# Patient Record
Sex: MALE | Race: WHITE | NOT HISPANIC OR LATINO | Employment: OTHER | URBAN - METROPOLITAN AREA
[De-identification: names, ages, dates, MRNs, and addresses within clinical notes are randomized per-mention and may not be internally consistent; named-entity substitution may affect disease eponyms.]

---

## 2018-04-03 RX ORDER — MULTIVITAMIN
1 TABLET ORAL EVERY MORNING
COMMUNITY

## 2018-04-03 RX ORDER — ATENOLOL 25 MG/1
25 TABLET ORAL EVERY MORNING
COMMUNITY

## 2018-04-03 RX ORDER — LOSARTAN POTASSIUM 100 MG/1
100 TABLET ORAL EVERY MORNING
COMMUNITY

## 2018-04-03 NOTE — PRE-PROCEDURE INSTRUCTIONS
Pre-Surgery Instructions:   Medication Instructions    atenolol (TENORMIN) 25 mg tablet Instructed patient per Anesthesia Guidelines   losartan (COZAAR) 100 MG tablet Instructed patient per Anesthesia Guidelines   Multiple Vitamin (MULTIVITAMIN) tablet Instructed patient per Anesthesia Guidelines  Patient instructed to take atenolol with sip of water in AM DOS  Patient instructed to stop all supplements and NSAIDS today for surgery on 04/05/2018

## 2018-04-04 ENCOUNTER — ANESTHESIA EVENT (OUTPATIENT)
Dept: PERIOP | Facility: HOSPITAL | Age: 63
End: 2018-04-04
Payer: COMMERCIAL

## 2018-04-05 ENCOUNTER — ANESTHESIA (OUTPATIENT)
Dept: PERIOP | Facility: HOSPITAL | Age: 63
End: 2018-04-05
Payer: COMMERCIAL

## 2018-04-05 ENCOUNTER — HOSPITAL ENCOUNTER (OUTPATIENT)
Facility: HOSPITAL | Age: 63
Setting detail: OUTPATIENT SURGERY
Discharge: HOME | End: 2018-04-05
Attending: UROLOGY | Admitting: UROLOGY
Payer: COMMERCIAL

## 2018-04-05 VITALS
HEART RATE: 58 BPM | DIASTOLIC BLOOD PRESSURE: 68 MMHG | RESPIRATION RATE: 18 BRPM | OXYGEN SATURATION: 99 % | TEMPERATURE: 98.4 F | WEIGHT: 170 LBS | HEIGHT: 67 IN | SYSTOLIC BLOOD PRESSURE: 127 MMHG | BODY MASS INDEX: 26.68 KG/M2

## 2018-04-05 DIAGNOSIS — R35.1 BPH ASSOCIATED WITH NOCTURIA: Primary | ICD-10-CM

## 2018-04-05 DIAGNOSIS — N40.1 BPH ASSOCIATED WITH NOCTURIA: Primary | ICD-10-CM

## 2018-04-05 PROCEDURE — L8699 PROSTHETIC IMPLANT NOS: HCPCS | Performed by: UROLOGY

## 2018-04-05 DEVICE — IMPLANT URO PROSTATE UROLIFT: Type: IMPLANTABLE DEVICE | Site: URINARY BLADDER | Status: FUNCTIONAL

## 2018-04-05 RX ORDER — FENTANYL CITRATE 50 UG/ML
INJECTION, SOLUTION INTRAMUSCULAR; INTRAVENOUS AS NEEDED
Status: DISCONTINUED | OUTPATIENT
Start: 2018-04-05 | End: 2018-04-05 | Stop reason: SURG

## 2018-04-05 RX ORDER — ACETAMINOPHEN 325 MG/1
650 TABLET ORAL EVERY 6 HOURS PRN
Status: DISCONTINUED | OUTPATIENT
Start: 2018-04-05 | End: 2018-04-05 | Stop reason: HOSPADM

## 2018-04-05 RX ORDER — SODIUM CHLORIDE 9 MG/ML
125 INJECTION, SOLUTION INTRAVENOUS CONTINUOUS
Status: DISCONTINUED | OUTPATIENT
Start: 2018-04-05 | End: 2018-04-05 | Stop reason: HOSPADM

## 2018-04-05 RX ORDER — MAGNESIUM HYDROXIDE 1200 MG/15ML
LIQUID ORAL AS NEEDED
Status: DISCONTINUED | OUTPATIENT
Start: 2018-04-05 | End: 2018-04-05 | Stop reason: HOSPADM

## 2018-04-05 RX ORDER — PHENAZOPYRIDINE HYDROCHLORIDE 200 MG/1
200 TABLET, FILM COATED ORAL ONCE
Status: COMPLETED | OUTPATIENT
Start: 2018-04-05 | End: 2018-04-05

## 2018-04-05 RX ORDER — FENTANYL CITRATE 50 UG/ML
50 INJECTION, SOLUTION INTRAMUSCULAR; INTRAVENOUS
Status: DISCONTINUED | OUTPATIENT
Start: 2018-04-05 | End: 2018-04-05 | Stop reason: HOSPADM

## 2018-04-05 RX ORDER — CIPROFLOXACIN 500 MG/1
500 TABLET, FILM COATED ORAL EVERY 12 HOURS SCHEDULED
Qty: 10 TABLET | Refills: 0 | Status: SHIPPED | OUTPATIENT
Start: 2018-04-05 | End: 2018-04-10

## 2018-04-05 RX ORDER — OXYCODONE HYDROCHLORIDE AND ACETAMINOPHEN 5; 325 MG/1; MG/1
1 TABLET ORAL EVERY 4 HOURS PRN
Status: DISCONTINUED | OUTPATIENT
Start: 2018-04-05 | End: 2018-04-05 | Stop reason: HOSPADM

## 2018-04-05 RX ORDER — TAMSULOSIN HYDROCHLORIDE 0.4 MG/1
0.4 CAPSULE ORAL ONCE
Status: COMPLETED | OUTPATIENT
Start: 2018-04-05 | End: 2018-04-05

## 2018-04-05 RX ORDER — ONDANSETRON 2 MG/ML
4 INJECTION INTRAMUSCULAR; INTRAVENOUS EVERY 6 HOURS PRN
Status: DISCONTINUED | OUTPATIENT
Start: 2018-04-05 | End: 2018-04-05 | Stop reason: HOSPADM

## 2018-04-05 RX ORDER — KETOROLAC TROMETHAMINE 30 MG/ML
INJECTION, SOLUTION INTRAMUSCULAR; INTRAVENOUS AS NEEDED
Status: DISCONTINUED | OUTPATIENT
Start: 2018-04-05 | End: 2018-04-05 | Stop reason: SURG

## 2018-04-05 RX ORDER — PROPOFOL 10 MG/ML
INJECTION, EMULSION INTRAVENOUS AS NEEDED
Status: DISCONTINUED | OUTPATIENT
Start: 2018-04-05 | End: 2018-04-05 | Stop reason: SURG

## 2018-04-05 RX ORDER — LIDOCAINE HYDROCHLORIDE 20 MG/ML
JELLY TOPICAL AS NEEDED
Status: DISCONTINUED | OUTPATIENT
Start: 2018-04-05 | End: 2018-04-05 | Stop reason: HOSPADM

## 2018-04-05 RX ORDER — ONDANSETRON 2 MG/ML
INJECTION INTRAMUSCULAR; INTRAVENOUS AS NEEDED
Status: DISCONTINUED | OUTPATIENT
Start: 2018-04-05 | End: 2018-04-05 | Stop reason: SURG

## 2018-04-05 RX ORDER — CIPROFLOXACIN 2 MG/ML
400 INJECTION, SOLUTION INTRAVENOUS
Status: DISCONTINUED | OUTPATIENT
Start: 2018-04-05 | End: 2018-04-05 | Stop reason: HOSPADM

## 2018-04-05 RX ORDER — MIDAZOLAM HYDROCHLORIDE 1 MG/ML
INJECTION INTRAMUSCULAR; INTRAVENOUS AS NEEDED
Status: DISCONTINUED | OUTPATIENT
Start: 2018-04-05 | End: 2018-04-05 | Stop reason: SURG

## 2018-04-05 RX ORDER — PHENAZOPYRIDINE HYDROCHLORIDE 200 MG/1
200 TABLET, FILM COATED ORAL
Qty: 6 TABLET | Refills: 0 | Status: SHIPPED | OUTPATIENT
Start: 2018-04-05 | End: 2018-04-07

## 2018-04-05 RX ADMIN — CIPROFLOXACIN: 2 INJECTION INTRAVENOUS at 10:20

## 2018-04-05 RX ADMIN — ONDANSETRON HYDROCHLORIDE 4 MG: 2 INJECTION, SOLUTION INTRAVENOUS at 10:32

## 2018-04-05 RX ADMIN — SODIUM CHLORIDE: 0.9 INJECTION, SOLUTION INTRAVENOUS at 10:42

## 2018-04-05 RX ADMIN — PROPOFOL 200 MG: 10 INJECTION, EMULSION INTRAVENOUS at 10:20

## 2018-04-05 RX ADMIN — FENTANYL CITRATE 50 MCG: 50 INJECTION, SOLUTION INTRAMUSCULAR; INTRAVENOUS at 10:29

## 2018-04-05 RX ADMIN — KETOROLAC TROMETHAMINE 30 MG: 30 INJECTION, SOLUTION INTRAMUSCULAR at 10:51

## 2018-04-05 RX ADMIN — SODIUM CHLORIDE 125 ML/HR: 0.9 INJECTION, SOLUTION INTRAVENOUS at 09:09

## 2018-04-05 RX ADMIN — FENTANYL CITRATE 50 MCG: 50 INJECTION, SOLUTION INTRAMUSCULAR; INTRAVENOUS at 10:38

## 2018-04-05 RX ADMIN — MIDAZOLAM HYDROCHLORIDE 2 MG: 1 INJECTION, SOLUTION INTRAMUSCULAR; INTRAVENOUS at 10:14

## 2018-04-05 RX ADMIN — TAMSULOSIN HYDROCHLORIDE 0.4 MG: 0.4 CAPSULE ORAL at 12:30

## 2018-04-05 RX ADMIN — PHENAZOPYRIDINE HYDROCHLORIDE 200 MG: 200 TABLET ORAL at 12:29

## 2018-04-05 RX ADMIN — DEXAMETHASONE SODIUM PHOSPHATE 8 MG: 10 INJECTION INTRAMUSCULAR; INTRAVENOUS at 10:31

## 2018-04-05 NOTE — OP NOTE
OPERATIVE REPORT    PATIENT NAME: Blayne Vega    :  1955  MRN: [id number]  Pt Location: [facility] OR ROOM 05    SURGERY DATE:   2018  Surgeon(s) and Role:   DO Marleny Nguyen Primary    Preop Diagnosis:  Enlarged prostate with lower urinary tract symptoms (LUTS) [N40 1]  Post-Op Diagnosis Codes:   Enlarged prostate with lower urinary tract symptoms (LUTS) [N40 1]  Procedure(s):  CYSTOSCOPY WITH INSERTION UROLIFT    Specimen(s):  None    Estimated Blood Loss:   Minimal    Drains: 20 Green[place]ic Villa catheter       Anesthesia Type:   General    Operative Indications:   symptomatic BPH with obstruction   failed transurethral needle ablation of the prostate      Operative Findings:   small prostate with bilateral lateral lobe obstruction   prostatic urethra measures approximately 2 5 -9 7TN    Complications:   None    Procedure and Technique:   patient was identified   general anesthesia was administered   genitals were prepped and draped   time-out was taken   rigid cystoscopy was performed   prostatic urethra measured approximately 2 5-3 cm   with obstructing lateral lobes bilaterally   bladder was slightly trabeculated   bladder was free of any stone tumor foreign body or other pathology   Urolift  Implant was placed 1 5 cm distal to the bladder neck on the left side   identical procedure was done 1 5 cm distal to the bladder neck on the right side   attention was then directed to the verumontanum where a 3rd implant was placed   on the left side at the level of the verumontanum    A 4th implant was placed at the level of the verumontanum on the right   cystoscopy was then done and it was felt that no more implants were necessary   nor was there  Sufficient space for placement of additional implants   a continuous anterior channel was created by placement of the  Four above-mentioned implants   cystoscope was removed   anesthetic  Jelly was instilled per urethra   20 Green[place]ic Villa catheter was placed with 30 cc in the balloon   patient tolerated the procedure well   and went to recovery room in good postoperative condition   Villa catheter will be removed tomorrow in the office       I was present for the entire procedure    Patient Disposition:  PACU     SIGNATURE: Jeanine Stephens DO  DATE: April 5, 2018  TIME: 11:28 AM

## 2018-04-05 NOTE — ANESTHESIA POSTPROCEDURE EVALUATION
Post-Op Assessment Note      CV Status:  Stable    Mental Status:  Alert and awake    Hydration Status:  Euvolemic    PONV Controlled:  Controlled    Airway Patency:  Patent    Post Op Vitals Reviewed: Yes          Staff: Anesthesiologist           /79 (04/05/18 1136)    Temp 98 4 °F (36 9 °C) (04/05/18 1136)    Pulse 58 (04/05/18 1136)   Resp 19 (04/05/18 1136)    SpO2 99 % (04/05/18 1136)

## 2018-04-05 NOTE — ANESTHESIA PREPROCEDURE EVALUATION
Review of Systems/Medical History  Patient summary reviewed  Chart reviewed      Cardiovascular  Exercise tolerance: good,  Hypertension controlled,    Pulmonary  Negative pulmonary ROS        GI/Hepatic  Negative GI/hepatic ROS          Negative  ROS        Endo/Other  Negative endo/other ROS      GYN  Negative gynecology ROS          Hematology  Negative hematology ROS      Musculoskeletal  Negative musculoskeletal ROS        Neurology  Negative neurology ROS      Psychology   Negative psychology ROS              Physical Exam    Airway    Mallampati score: II  TM Distance: <3 FB  Neck ROM: full     Dental       Cardiovascular  Rhythm: regular, Rate: normal,     Pulmonary  Breath sounds clear to auscultation,     Other Findings  Upper front cap        Anesthesia Plan  ASA Score- 2     Anesthesia Type- general with ASA Monitors  Additional Monitors:   Airway Plan:         Plan Factors- Patient instructed to abstain from smoking on day of procedure  Patient did not smoke on day of surgery  Induction- intravenous  Postoperative Plan-     Informed Consent- Anesthetic plan and risks discussed with patient

## 2018-04-05 NOTE — DISCHARGE INSTRUCTIONS
Villa Catheter Placement and Care   AMBULATORY CARE:   A Villa catheter  is a sterile tube that is inserted into your bladder to drain urine  It is also called an indwelling urinary catheter  The tip of the catheter has a small balloon filled with solution that holds the catheter in your bladder  How a Villa catheter is placed:   · Your healthcare provider will clean your genital area with a sterile solution  He or she will put lubricant jelly on the catheter to help it go in smoothly  The end of the catheter with the deflated balloon will be inserted into your urethra  The catheter will be moved slowly and gently into your bladder  You may be asked to take slow, deep breaths or to push as if you were trying to urinate as the catheter is inserted  · When your healthcare provider sees urine flowing from the catheter, he or she will fill the balloon at the end of the catheter  The balloon holds the catheter in place so it does not come out  Your healthcare provider will attach the open end of the catheter to a sterile drainage bag  Seek care immediately if:   · Your catheter comes out  · You suddenly have material that looks like sand in the tubing or drainage bag  · No urine is draining into the bag and you have checked the system  · You have pain in your hip, back, pelvis, or lower abdomen  · You are confused or cannot think clearly  Contact your healthcare provider if:   · You have a fever  · You have bladder spasms for more than 1 day after the catheter is placed  · You see blood in the tubing or drainage bag  · You have a rash or itching where the catheter tube is secured to your skin  · Urine leaks from or around the catheter, tubing, or drainage bag  · The closed drainage system has accidently come open or apart  · You see a layer of crystals inside the tubing  · You have questions or concerns about your condition or care    Care for your Villa catheter: · Clean your genital area 2 times every day  Clean your catheter and the area around where it was inserted  Use soap and water  Clean your anal opening and catheter area after every bowel movement  · Secure the catheter tube  so you do not pull or move the catheter  This helps prevent pain and bladder spasms  Healthcare providers will show you how to use medical tape or a strap to secure the catheter tube to your body  · Keep a closed drainage system  Your Villa catheter should always be attached to the drainage bag to form a closed system  Do not disconnect any part of the closed system unless you need to change the bag  Care for your drainage bag:   · Ask if a leg bag is right for you  A leg bag can be worn under your clothes  Ask your healthcare provider for more information about a leg bag  · Keep the drainage bag below the level of your waist   This helps stop urine from moving back up the tubing and into your bladder  Do not loop or kink the tubing  This can cause urine to back up and collect in your bladder  Do not let the drainage bag touch or lie on the floor  · Empty the drainage bag when needed  The weight of a full drainage bag can be painful  Empty the drainage bag every 3 to 6 hours or when it is ? full  · Clean and change the drainage bag as directed  Ask your healthcare provider how often you should change the drainage bag and what cleaning solution to use  Wear disposable gloves when you change the bag  Do not allow the end of the catheter or tubing to touch anything  Clean the ends with an alcohol pad before you reconnect them  What to do if problems develop:   · No urine is draining into the bag:      ¨ Check for kinks in the tubing and straighten them out  ¨ Check the tape or strap used to secure the catheter tube to your skin  Make sure it is not blocking the tube  ¨ Make sure you are not sitting or lying on the tubing      ¨ Make sure the urine bag is hanging below the level of your waist     · Urine leaks from or around the catheter, tubing, or drainage bag:  Check if the closed drainage system has accidently come open or apart  Clean the catheter and tubing ends with a new alcohol pad and reconnect them  Prevent an infection:   · Wash your hands often  Wash before and after you touch your catheter, tubing, or drainage bag  Use soap and water  Wear clean disposable gloves when you care for your catheter or disconnect the drainage bag  Wash your hands before you prepare or eat food  · Drink liquids as directed  Ask your healthcare provider how much liquid to drink each day and which liquids are best for you  Liquids will help flush your kidneys and bladder to help prevent infection  Follow up with your healthcare provider as directed:  Write down your questions so you remember to ask them during your visits  © 2017 [employer] Information is for End User's use only and may not be sold, redistributed or otherwise used for commercial purposes  All illustrations and images included in CareNotes® are the copyrighted property of [employer]  or [employer]  The above information is an  only  It is not intended as medical advice for individual conditions or treatments  Talk to your doctor, nurse or pharmacist before following any medical regimen to see if it is safe and effective for you

## 2018-04-06 ENCOUNTER — HOSPITAL ENCOUNTER (EMERGENCY)
Facility: HOSPITAL | Age: 63
Discharge: HOME | End: 2018-04-06
Payer: COMMERCIAL

## 2018-04-06 VITALS
OXYGEN SATURATION: 96 % | HEART RATE: 72 BPM | RESPIRATION RATE: 16 BRPM | TEMPERATURE: 97.9 F | DIASTOLIC BLOOD PRESSURE: 82 MMHG | WEIGHT: 168 LBS | SYSTOLIC BLOOD PRESSURE: 162 MMHG

## 2018-04-06 DIAGNOSIS — R33.9 URINARY RETENTION: Primary | ICD-10-CM

## 2018-04-06 PROCEDURE — 99283 EMERGENCY DEPT VISIT LOW MDM: CPT

## 2018-04-06 RX ORDER — LIDOCAINE HYDROCHLORIDE 20 MG/ML
JELLY TOPICAL ONCE
Status: COMPLETED | OUTPATIENT
Start: 2018-04-06 | End: 2018-04-06

## 2018-04-06 RX ADMIN — LIDOCAINE HYDROCHLORIDE: 20 JELLY TOPICAL at 20:24

## 2018-04-07 ENCOUNTER — HOSPITAL ENCOUNTER (EMERGENCY)
Facility: HOSPITAL | Age: 63
Discharge: HOME | End: 2018-04-07
Admitting: EMERGENCY MEDICINE
Payer: COMMERCIAL

## 2018-04-07 VITALS
RESPIRATION RATE: 16 BRPM | SYSTOLIC BLOOD PRESSURE: 136 MMHG | DIASTOLIC BLOOD PRESSURE: 88 MMHG | OXYGEN SATURATION: 95 % | WEIGHT: 167.99 LBS | HEART RATE: 83 BPM | TEMPERATURE: 97.4 F

## 2018-04-07 DIAGNOSIS — T83.9XXA FOLEY CATHETER PROBLEM, INITIAL ENCOUNTER (HCC): ICD-10-CM

## 2018-04-07 DIAGNOSIS — Z97.8 FOLEY CATHETER IN PLACE: Primary | ICD-10-CM

## 2018-04-07 PROCEDURE — 99283 EMERGENCY DEPT VISIT LOW MDM: CPT

## 2018-04-07 RX ORDER — ACETAMINOPHEN 325 MG/1
975 TABLET ORAL ONCE
Status: COMPLETED | OUTPATIENT
Start: 2018-04-07 | End: 2018-04-07

## 2018-04-07 RX ORDER — OXYCODONE HYDROCHLORIDE 5 MG/1
5 TABLET ORAL ONCE
Status: COMPLETED | OUTPATIENT
Start: 2018-04-07 | End: 2018-04-07

## 2018-04-07 RX ORDER — OXYCODONE HYDROCHLORIDE 5 MG/1
5 TABLET ORAL EVERY 4 HOURS PRN
Qty: 14 TABLET | Refills: 0 | Status: SHIPPED | OUTPATIENT
Start: 2018-04-07 | End: 2018-04-17

## 2018-04-07 RX ORDER — IBUPROFEN 400 MG/1
800 TABLET ORAL ONCE
Status: COMPLETED | OUTPATIENT
Start: 2018-04-07 | End: 2018-04-07

## 2018-04-07 RX ORDER — ACETAMINOPHEN 500 MG
500 TABLET ORAL EVERY 6 HOURS PRN
Qty: 30 TABLET | Refills: 0 | Status: SHIPPED | OUTPATIENT
Start: 2018-04-07

## 2018-04-07 RX ORDER — IBUPROFEN 400 MG/1
400 TABLET ORAL EVERY 6 HOURS PRN
Qty: 30 TABLET | Refills: 0 | Status: SHIPPED | OUTPATIENT
Start: 2018-04-07

## 2018-04-07 RX ADMIN — ACETAMINOPHEN 975 MG: 325 TABLET, FILM COATED ORAL at 03:53

## 2018-04-07 RX ADMIN — OXYCODONE HYDROCHLORIDE 5 MG: 5 TABLET ORAL at 03:53

## 2018-04-07 RX ADMIN — IBUPROFEN 800 MG: 400 TABLET ORAL at 03:53

## 2018-04-07 NOTE — ED PROVIDER NOTES
History  Chief Complaint   Patient presents with    Urinary Retention     Pt had uro-lift at [facility] yesterday; had catheter removed this am; Pt stopped urinating around 13:00 today; Talked to urologist and was advised to come to ED       History provided by:  Patient   used: No    Urinary Catheter Insertion or Check   Location:  Suprapubic pain sensation of urinary retention  Severity:  Severe  Onset quality:  Gradual  Timing:  Constant  Progression:  Worsening  Chronicity:  New  Context:  Recent urological procedure  Relieved by:  Nothing  Worsened by: Inability to void  Associated symptoms: abdominal pain (Nothing)    Associated symptoms: no chest pain, no congestion, no cough, no diarrhea, no ear pain, no fatigue, no rash and no shortness of breath    Abdominal pain:     Location:  Suprapubic    Quality: not aching      Severity:  Moderate      None       History reviewed  No pertinent past medical history  History reviewed  No pertinent surgical history  History reviewed  No pertinent family history  I have reviewed and agree with the history as documented  Social History   Substance Use Topics    Smoking status: Never Smoker    Smokeless tobacco: Never Used    Alcohol use Yes      Comment: social        Review of Systems   Constitutional: Negative for fatigue  HENT: Negative for congestion and ear pain  Eyes: Negative for pain  Respiratory: Negative for cough and shortness of breath  Cardiovascular: Negative for chest pain  Gastrointestinal: Positive for abdominal pain (Nothing)  Negative for diarrhea  Endocrine: Negative for polydipsia  Genitourinary: Negative for enuresis  Musculoskeletal: Negative for gait problem  Skin: Negative for rash  Allergic/Immunologic: Negative for food allergies  Neurological: Negative for numbness  Hematological: Negative for adenopathy  Psychiatric/Behavioral: Negative for behavioral problems         Physical Exam  ED Triage Vitals [04/06/18 1931]   Temperature Pulse Respirations Blood Pressure SpO2   97 9 °F (36 6 °C) 72 16 162/82 96 %      Temp Source Heart Rate Source Patient Position - Orthostatic VS BP Location FiO2 (%)   Oral Monitor -- -- --      Pain Score       6           Orthostatic Vital Signs  Vitals:    04/06/18 1931   BP: 162/82   Pulse: 72       Physical Exam   Constitutional: He appears well-developed and well-nourished  He appears distressed  Distress secondary to inability to void  HENT:   Head: Normocephalic and atraumatic  Eyes: Conjunctivae are normal    Neck: Neck supple  No JVD present  Cardiovascular: Normal rate  Pulmonary/Chest: Effort normal    Abdominal: He exhibits no distension  There is tenderness in the suprapubic area  Skin: He is not diaphoretic  ED Medications  Medications   lidocaine (URO-JET) 2 % topical gel ( Topical Given 4/6/18 2024)       Diagnostic Studies  Results Reviewed     None                 No orders to display              Procedures  Bladder Cath  Date/Time: 4/6/2018 8:36 PM  Performed by: Yonathan Pope by: Davon Hudson     Patient location:  ED  Procedure performed by consultant: Mari Woods  Consent:     Consent obtained:  Verbal    Consent given by:  Patient    Risks discussed:  False passage, infection, urethral injury, incomplete procedure and pain    Alternatives discussed:  No treatment and delayed treatment  Universal protocol:     Procedure explained and questions answered to patient or proxy's satisfaction: yes      Relevant documents present and verified: yes      Patient identity confirmed:  Verbally with patient, arm band, provided demographic data and hospital-assigned identification number  Pre-procedure details:     Procedure purpose:  Therapeutic  Anesthesia (see MAR for exact dosages):      Anesthesia method:  Topical application    Topical anesthetic:  Lidocaine gel  Procedure details:     Bladder irrigation: no      Catheter insertion:  Indwelling    Approach: natural orifice      Catheter type:  Non-latex    Catheter size:  16 Fr    Number of attempts:  1    Successful placement: yes      Urine characteristics:  Clear and yellow  Post-procedure details:     Patient tolerance of procedure: Tolerated well, no immediate complications           Phone Contacts  ED Phone Contact    ED Course  ED Course                                MDM  Number of Diagnoses or Management Options  Urinary retention:   Diagnosis management comments: 56-year-old male presents emergency department for inability to void and suprapubic tenderness  Patient with recent urological instrumentation and surgical procedure  Patient states that rapid inability to void this day  Ultrasound was utilized to visualize bladder with significant amount of fluid  Villa catheter was placed by myself  Patient approximately 550 milliliters of clear yellow tinged urine  Patient does admit to taking peridium  No evidence of blood in urine  Patient tolerated procedure well and had significant improvement of his symptoms  Villa catheter will remain in place with close follow up with his known urologist   Patient encouraged to take medicines as prescribed  Patient educated on persistent or worsening signs symptoms and to return to the emergency department or follow up with his known urologist   Patient admits to understanding and agreement  Patient was discharged in ambulatory improved condition  CritCare Time    Disposition  Final diagnoses:   Urinary retention     Time reflects when diagnosis was documented in both MDM as applicable and the Disposition within this note     Time User Action Codes Description Comment    4/6/2018  8:34 PM Alicia Allen Add [R33 9] Urinary retention       ED Disposition     ED Disposition Condition Comment    Discharge  Rosanna Cota discharge to home/self care      Condition at discharge: Stable        Follow-up Information     Follow up With Specialties Details Why Contact Info    Jeffery Prader, DO Urology   [address] [facility]  [place] [address] [place] [address]  [phone]          There are no discharge medications for this patient  No discharge procedures on file      ED Provider  Electronically Signed by           Berkley Casper PA-C  04/09/18 9779

## 2018-04-07 NOTE — DISCHARGE INSTRUCTIONS
Urinary Retention in Men   WHAT YOU NEED TO KNOW:   Urinary retention is a condition that develops when your bladder does not empty completely when you urinate  DISCHARGE INSTRUCTIONS:   Medicines:   · Medicines  can help decrease the size of your prostate, fight infection, and help you urinate more easily  · Take your medicine as directed  Contact your healthcare provider if you think your medicine is not helping or if you have side effects  Tell him or her if you are allergic to any medicine  Keep a list of the medicines, vitamins, and herbs you take  Include the amounts, and when and why you take them  Bring the list or the pill bottles to follow-up visits  Carry your medicine list with you in case of an emergency  Villa catheter care: You may need a Villa catheter for up to 2 weeks at home  Healthcare providers will give you a smaller leg bag to collect urine  Keep the bag below your waist  This will prevent urine from flowing back into your bladder and causing an infection or other problems  Also, keep the tube free of kinks so the urine will drain properly  Do not pull on the catheter  This can cause pain and bleeding, and may cause the catheter to come out  Ask your healthcare provider or urologist for more information on Villa catheter care  Urinate regularly:  When your catheter is removed, do not let your bladder become too full before you urinate  Set regular times each day to urinate  Urinate as soon as you feel the need or at least every 3 hours while you are awake  Do not drink liquids before you go to bed  Urinate right before you go to bed  Follow up with your healthcare provider or urologist as directed:  Write down your questions so you remember to ask them during your visits  Contact your healthcare provider or urologist if:   · You have a fever  · You have pain when you urinate  · You have blood in your urine  · You have problems with your catheter      · You have questions or concerns about your condition or care  Return to the emergency department if:   · You have severe abdominal pain  · You are breathing faster than usual     · Your heartbeat is faster than usual     · Your face, hands, feet, or ankles are swollen  © 2017 [employer] Information is for End User's use only and may not be sold, redistributed or otherwise used for commercial purposes  All illustrations and images included in CareNotes® are the copyrighted property of [employer]  or [employer]  The above information is an  only  It is not intended as medical advice for individual conditions or treatments  Talk to your doctor, nurse or pharmacist before following any medical regimen to see if it is safe and effective for you

## 2018-04-07 NOTE — ED PROVIDER NOTES
History  Chief Complaint   Patient presents with    Urinary Catheter Problem     patient reports no drainage from catheter since 2300  HPI    None       History reviewed  No pertinent past medical history  History reviewed  No pertinent surgical history  History reviewed  No pertinent family history  I have reviewed and agree with the history as documented  Social History   Substance Use Topics    Smoking status: Never Smoker    Smokeless tobacco: Never Used    Alcohol use Yes      Comment: social        Review of Systems    Physical Exam  ED Triage Vitals [04/07/18 0330]   Temperature Pulse Respirations Blood Pressure SpO2   (!) 97 4 °F (36 3 °C) 83 16 136/88 95 %      Temp Source Heart Rate Source Patient Position - Orthostatic VS BP Location FiO2 (%)   Oral -- Standing Right arm --      Pain Score       --           Orthostatic Vital Signs  Vitals:    04/07/18 0330   BP: 136/88   Pulse: 83   Patient Position - Orthostatic VS: Standing       Physical Exam    ED Medications  Medications   oxyCODONE (ROXICODONE) IR tablet 5 mg (5 mg Oral Given 4/7/18 0353)   acetaminophen (TYLENOL) tablet 975 mg (975 mg Oral Given 4/7/18 0353)   ibuprofen (MOTRIN) tablet 800 mg (800 mg Oral Given 4/7/18 0353)       Diagnostic Studies  Results Reviewed     None                 No orders to display              Procedures  Procedures       Phone Contacts  ED Phone Contact    ED Course  ED Course                                MDM  Number of Diagnoses or Management Options  Villa catheter in place: Villa catheter problem, initial encounter [facility]):   Diagnosis management comments: Mr Pabon Re presents emergency department for urinary urgency from earlier evaluation this shift  Villa catheter was placed by myself  Ultrasound demonstrates that recent Villa placed is working appropriately  There is clear urine in bag  No evidence of blood  Patient does admit that he did not drink fluids when he got home    At this time will not treat any differently  Will provide narcotic pain medication for his discomfort  CritCare Time    Disposition  Final diagnoses: Villa catheter in place   Villa catheter problem, initial encounter [facility])     Time reflects when diagnosis was documented in both MDM as applicable and the Disposition within this note     Time User Action Codes Description Comment    4/7/2018  3:44 AM Alicia Allen Add [Z92 89] Villa catheter in place     4/7/2018  3:45 AM Pricilla Leigh Add [T83  [id number]] Villa catheter problem, initial encounter [facility])       ED Disposition     ED Disposition Condition Comment    Discharge  Rosanna Linrosaurazuleika discharge to home/self care  Condition at discharge: Stable        Follow-up Information     Follow up With Specialties Details Why [address] [facility],  Urology   [address]  [phone]          Discharge Medication List as of 4/7/2018  3:49 AM      START taking these medications    Details   acetaminophen (TYLENOL) 500 mg tablet Take 1 tablet (500 mg total) by mouth every 6 (six) hours as needed for mild pain, moderate pain, headaches or fever, Starting Sat 4/7/2018, Print      ibuprofen (MOTRIN) 400 mg tablet Take 1 tablet (400 mg total) by mouth every 6 (six) hours as needed for mild pain, Starting Sat 4/7/2018, Print      oxyCODONE (ROXICODONE) 5 mg immediate release tablet Take 1 tablet (5 mg total) by mouth every 4 (four) hours as needed for moderate pain for up to 10 days Max Daily Amount: 30 mg, Starting Sat 4/7/2018, Until Tue 4/17/2018, Print           No discharge procedures on file      ED Provider  Electronically Signed by           Jane Valladares PA-C  04/09/18 2191

## 2020-05-27 ENCOUNTER — OFFICE VISIT (OUTPATIENT)
Dept: PHYSICAL THERAPY | Facility: CLINIC | Age: 65
End: 2020-05-27
Payer: MEDICARE

## 2020-05-27 DIAGNOSIS — M25.562 ACUTE PAIN OF LEFT KNEE: ICD-10-CM

## 2020-05-27 DIAGNOSIS — Z96.652 STATUS POST LEFT KNEE REPLACEMENT: Primary | ICD-10-CM

## 2020-05-27 PROCEDURE — 97112 NEUROMUSCULAR REEDUCATION: CPT | Performed by: PHYSICAL THERAPIST

## 2020-05-27 PROCEDURE — 97110 THERAPEUTIC EXERCISES: CPT | Performed by: PHYSICAL THERAPIST

## 2020-05-27 PROCEDURE — 97014 ELECTRIC STIMULATION THERAPY: CPT | Performed by: PHYSICAL THERAPIST

## 2020-05-27 PROCEDURE — 97162 PT EVAL MOD COMPLEX 30 MIN: CPT | Performed by: PHYSICAL THERAPIST

## 2020-05-28 ENCOUNTER — TRANSCRIBE ORDERS (OUTPATIENT)
Dept: PHYSICAL THERAPY | Facility: CLINIC | Age: 65
End: 2020-05-28

## 2020-05-28 DIAGNOSIS — Z96.652 STATUS POST LEFT KNEE REPLACEMENT: Primary | ICD-10-CM

## 2020-05-28 DIAGNOSIS — M25.562 ACUTE PAIN OF LEFT KNEE: ICD-10-CM

## 2020-05-29 ENCOUNTER — OFFICE VISIT (OUTPATIENT)
Dept: PHYSICAL THERAPY | Facility: CLINIC | Age: 65
End: 2020-05-29
Payer: MEDICARE

## 2020-05-29 DIAGNOSIS — Z96.652 STATUS POST LEFT KNEE REPLACEMENT: Primary | ICD-10-CM

## 2020-05-29 DIAGNOSIS — M25.562 ACUTE PAIN OF LEFT KNEE: ICD-10-CM

## 2020-05-29 PROCEDURE — 97110 THERAPEUTIC EXERCISES: CPT | Performed by: PHYSICAL THERAPIST

## 2020-05-29 PROCEDURE — 97112 NEUROMUSCULAR REEDUCATION: CPT | Performed by: PHYSICAL THERAPIST

## 2020-05-29 PROCEDURE — 97116 GAIT TRAINING THERAPY: CPT | Performed by: PHYSICAL THERAPIST

## 2020-06-01 ENCOUNTER — OFFICE VISIT (OUTPATIENT)
Dept: PHYSICAL THERAPY | Facility: CLINIC | Age: 65
End: 2020-06-01
Payer: MEDICARE

## 2020-06-01 DIAGNOSIS — Z96.652 STATUS POST LEFT KNEE REPLACEMENT: Primary | ICD-10-CM

## 2020-06-01 DIAGNOSIS — M25.562 ACUTE PAIN OF LEFT KNEE: ICD-10-CM

## 2020-06-01 PROCEDURE — 97140 MANUAL THERAPY 1/> REGIONS: CPT | Performed by: PHYSICAL THERAPIST

## 2020-06-01 PROCEDURE — 97110 THERAPEUTIC EXERCISES: CPT | Performed by: PHYSICAL THERAPIST

## 2020-06-01 PROCEDURE — 97112 NEUROMUSCULAR REEDUCATION: CPT | Performed by: PHYSICAL THERAPIST

## 2020-06-03 ENCOUNTER — OFFICE VISIT (OUTPATIENT)
Dept: PHYSICAL THERAPY | Facility: CLINIC | Age: 65
End: 2020-06-03
Payer: MEDICARE

## 2020-06-03 DIAGNOSIS — Z96.652 STATUS POST LEFT KNEE REPLACEMENT: Primary | ICD-10-CM

## 2020-06-03 DIAGNOSIS — M25.562 ACUTE PAIN OF LEFT KNEE: ICD-10-CM

## 2020-06-03 PROCEDURE — 97110 THERAPEUTIC EXERCISES: CPT | Performed by: PHYSICAL THERAPIST

## 2020-06-03 PROCEDURE — 97140 MANUAL THERAPY 1/> REGIONS: CPT | Performed by: PHYSICAL THERAPIST

## 2020-06-03 PROCEDURE — 97112 NEUROMUSCULAR REEDUCATION: CPT | Performed by: PHYSICAL THERAPIST

## 2020-06-05 ENCOUNTER — OFFICE VISIT (OUTPATIENT)
Dept: PHYSICAL THERAPY | Facility: CLINIC | Age: 65
End: 2020-06-05
Payer: MEDICARE

## 2020-06-05 DIAGNOSIS — Z96.652 STATUS POST LEFT KNEE REPLACEMENT: Primary | ICD-10-CM

## 2020-06-05 DIAGNOSIS — M25.562 ACUTE PAIN OF LEFT KNEE: ICD-10-CM

## 2020-06-05 PROCEDURE — 97140 MANUAL THERAPY 1/> REGIONS: CPT | Performed by: PHYSICAL THERAPIST

## 2020-06-05 PROCEDURE — 97110 THERAPEUTIC EXERCISES: CPT | Performed by: PHYSICAL THERAPIST

## 2020-06-05 PROCEDURE — 97112 NEUROMUSCULAR REEDUCATION: CPT | Performed by: PHYSICAL THERAPIST

## 2020-06-08 ENCOUNTER — OFFICE VISIT (OUTPATIENT)
Dept: PHYSICAL THERAPY | Facility: CLINIC | Age: 65
End: 2020-06-08
Payer: MEDICARE

## 2020-06-08 DIAGNOSIS — M25.562 ACUTE PAIN OF LEFT KNEE: ICD-10-CM

## 2020-06-08 DIAGNOSIS — Z96.652 STATUS POST LEFT KNEE REPLACEMENT: Primary | ICD-10-CM

## 2020-06-08 PROCEDURE — 97110 THERAPEUTIC EXERCISES: CPT | Performed by: PHYSICAL THERAPIST

## 2020-06-08 PROCEDURE — 97112 NEUROMUSCULAR REEDUCATION: CPT | Performed by: PHYSICAL THERAPIST

## 2020-06-08 PROCEDURE — 97140 MANUAL THERAPY 1/> REGIONS: CPT | Performed by: PHYSICAL THERAPIST

## 2020-06-10 ENCOUNTER — OFFICE VISIT (OUTPATIENT)
Dept: PHYSICAL THERAPY | Facility: CLINIC | Age: 65
End: 2020-06-10
Payer: MEDICARE

## 2020-06-10 DIAGNOSIS — Z96.652 STATUS POST LEFT KNEE REPLACEMENT: Primary | ICD-10-CM

## 2020-06-10 DIAGNOSIS — M25.562 ACUTE PAIN OF LEFT KNEE: ICD-10-CM

## 2020-06-10 PROCEDURE — 97140 MANUAL THERAPY 1/> REGIONS: CPT | Performed by: PHYSICAL THERAPIST

## 2020-06-10 PROCEDURE — 97110 THERAPEUTIC EXERCISES: CPT | Performed by: PHYSICAL THERAPIST

## 2020-06-10 PROCEDURE — 97112 NEUROMUSCULAR REEDUCATION: CPT | Performed by: PHYSICAL THERAPIST

## 2020-06-12 ENCOUNTER — OFFICE VISIT (OUTPATIENT)
Dept: PHYSICAL THERAPY | Facility: CLINIC | Age: 65
End: 2020-06-12
Payer: MEDICARE

## 2020-06-12 DIAGNOSIS — M25.562 ACUTE PAIN OF LEFT KNEE: ICD-10-CM

## 2020-06-12 DIAGNOSIS — Z96.652 STATUS POST LEFT KNEE REPLACEMENT: Primary | ICD-10-CM

## 2020-06-12 PROCEDURE — 97116 GAIT TRAINING THERAPY: CPT | Performed by: PHYSICAL THERAPIST

## 2020-06-12 PROCEDURE — 97110 THERAPEUTIC EXERCISES: CPT | Performed by: PHYSICAL THERAPIST

## 2020-06-12 PROCEDURE — 97140 MANUAL THERAPY 1/> REGIONS: CPT | Performed by: PHYSICAL THERAPIST

## 2020-06-12 PROCEDURE — 97112 NEUROMUSCULAR REEDUCATION: CPT | Performed by: PHYSICAL THERAPIST

## 2020-06-15 ENCOUNTER — OFFICE VISIT (OUTPATIENT)
Dept: PHYSICAL THERAPY | Facility: CLINIC | Age: 65
End: 2020-06-15
Payer: MEDICARE

## 2020-06-15 DIAGNOSIS — M25.562 ACUTE PAIN OF LEFT KNEE: ICD-10-CM

## 2020-06-15 DIAGNOSIS — Z96.652 STATUS POST LEFT KNEE REPLACEMENT: Primary | ICD-10-CM

## 2020-06-15 PROCEDURE — 97112 NEUROMUSCULAR REEDUCATION: CPT | Performed by: PHYSICAL THERAPIST

## 2020-06-15 PROCEDURE — 97110 THERAPEUTIC EXERCISES: CPT | Performed by: PHYSICAL THERAPIST

## 2020-06-15 PROCEDURE — 97140 MANUAL THERAPY 1/> REGIONS: CPT | Performed by: PHYSICAL THERAPIST

## 2020-06-17 ENCOUNTER — OFFICE VISIT (OUTPATIENT)
Dept: PHYSICAL THERAPY | Facility: CLINIC | Age: 65
End: 2020-06-17
Payer: MEDICARE

## 2020-06-17 DIAGNOSIS — M25.562 ACUTE PAIN OF LEFT KNEE: ICD-10-CM

## 2020-06-17 DIAGNOSIS — Z96.652 STATUS POST LEFT KNEE REPLACEMENT: Primary | ICD-10-CM

## 2020-06-17 PROCEDURE — 97140 MANUAL THERAPY 1/> REGIONS: CPT | Performed by: PHYSICAL THERAPIST

## 2020-06-17 PROCEDURE — 97112 NEUROMUSCULAR REEDUCATION: CPT | Performed by: PHYSICAL THERAPIST

## 2020-06-17 PROCEDURE — 97110 THERAPEUTIC EXERCISES: CPT | Performed by: PHYSICAL THERAPIST

## 2020-06-19 ENCOUNTER — OFFICE VISIT (OUTPATIENT)
Dept: PHYSICAL THERAPY | Facility: CLINIC | Age: 65
End: 2020-06-19
Payer: MEDICARE

## 2020-06-19 DIAGNOSIS — M25.562 ACUTE PAIN OF LEFT KNEE: ICD-10-CM

## 2020-06-19 DIAGNOSIS — Z96.652 STATUS POST LEFT KNEE REPLACEMENT: Primary | ICD-10-CM

## 2020-06-19 PROCEDURE — 97140 MANUAL THERAPY 1/> REGIONS: CPT | Performed by: PHYSICAL THERAPIST

## 2020-06-19 PROCEDURE — 97110 THERAPEUTIC EXERCISES: CPT | Performed by: PHYSICAL THERAPIST

## 2020-06-19 PROCEDURE — 97112 NEUROMUSCULAR REEDUCATION: CPT | Performed by: PHYSICAL THERAPIST

## 2020-06-22 ENCOUNTER — OFFICE VISIT (OUTPATIENT)
Dept: PHYSICAL THERAPY | Facility: CLINIC | Age: 65
End: 2020-06-22
Payer: MEDICARE

## 2020-06-22 DIAGNOSIS — Z96.652 STATUS POST LEFT KNEE REPLACEMENT: Primary | ICD-10-CM

## 2020-06-22 DIAGNOSIS — M25.562 ACUTE PAIN OF LEFT KNEE: ICD-10-CM

## 2020-06-22 PROCEDURE — 97112 NEUROMUSCULAR REEDUCATION: CPT | Performed by: PHYSICAL THERAPIST

## 2020-06-22 PROCEDURE — 97140 MANUAL THERAPY 1/> REGIONS: CPT | Performed by: PHYSICAL THERAPIST

## 2020-06-22 PROCEDURE — 97110 THERAPEUTIC EXERCISES: CPT | Performed by: PHYSICAL THERAPIST

## 2020-06-24 ENCOUNTER — OFFICE VISIT (OUTPATIENT)
Dept: PHYSICAL THERAPY | Facility: CLINIC | Age: 65
End: 2020-06-24
Payer: MEDICARE

## 2020-06-24 DIAGNOSIS — Z96.652 STATUS POST LEFT KNEE REPLACEMENT: Primary | ICD-10-CM

## 2020-06-24 DIAGNOSIS — M25.562 ACUTE PAIN OF LEFT KNEE: ICD-10-CM

## 2020-06-24 PROCEDURE — 97140 MANUAL THERAPY 1/> REGIONS: CPT | Performed by: PHYSICAL THERAPIST

## 2020-06-24 PROCEDURE — 97110 THERAPEUTIC EXERCISES: CPT | Performed by: PHYSICAL THERAPIST

## 2020-06-24 PROCEDURE — 97112 NEUROMUSCULAR REEDUCATION: CPT | Performed by: PHYSICAL THERAPIST

## 2020-06-26 ENCOUNTER — OFFICE VISIT (OUTPATIENT)
Dept: PHYSICAL THERAPY | Facility: CLINIC | Age: 65
End: 2020-06-26
Payer: MEDICARE

## 2020-06-26 DIAGNOSIS — M25.562 ACUTE PAIN OF LEFT KNEE: ICD-10-CM

## 2020-06-26 DIAGNOSIS — Z96.652 STATUS POST LEFT KNEE REPLACEMENT: Primary | ICD-10-CM

## 2020-06-26 PROCEDURE — 97140 MANUAL THERAPY 1/> REGIONS: CPT | Performed by: PHYSICAL THERAPIST

## 2020-06-26 PROCEDURE — 97110 THERAPEUTIC EXERCISES: CPT | Performed by: PHYSICAL THERAPIST

## 2020-06-26 PROCEDURE — 97112 NEUROMUSCULAR REEDUCATION: CPT | Performed by: PHYSICAL THERAPIST

## 2020-06-29 ENCOUNTER — OFFICE VISIT (OUTPATIENT)
Dept: PHYSICAL THERAPY | Facility: CLINIC | Age: 65
End: 2020-06-29
Payer: MEDICARE

## 2020-06-29 DIAGNOSIS — Z96.652 STATUS POST LEFT KNEE REPLACEMENT: Primary | ICD-10-CM

## 2020-06-29 DIAGNOSIS — M25.562 ACUTE PAIN OF LEFT KNEE: ICD-10-CM

## 2020-06-29 PROCEDURE — 97110 THERAPEUTIC EXERCISES: CPT | Performed by: PHYSICAL THERAPIST

## 2020-06-29 PROCEDURE — 97112 NEUROMUSCULAR REEDUCATION: CPT | Performed by: PHYSICAL THERAPIST

## 2020-06-29 PROCEDURE — 97140 MANUAL THERAPY 1/> REGIONS: CPT | Performed by: PHYSICAL THERAPIST

## 2020-07-01 ENCOUNTER — OFFICE VISIT (OUTPATIENT)
Dept: PHYSICAL THERAPY | Facility: CLINIC | Age: 65
End: 2020-07-01
Payer: MEDICARE

## 2020-07-01 DIAGNOSIS — Z96.652 STATUS POST LEFT KNEE REPLACEMENT: Primary | ICD-10-CM

## 2020-07-01 DIAGNOSIS — M25.562 ACUTE PAIN OF LEFT KNEE: ICD-10-CM

## 2020-07-01 PROCEDURE — 97110 THERAPEUTIC EXERCISES: CPT | Performed by: PHYSICAL THERAPIST

## 2020-07-01 PROCEDURE — 97112 NEUROMUSCULAR REEDUCATION: CPT | Performed by: PHYSICAL THERAPIST

## 2020-07-01 PROCEDURE — 97140 MANUAL THERAPY 1/> REGIONS: CPT | Performed by: PHYSICAL THERAPIST

## 2020-07-01 NOTE — PROGRESS NOTES
Daily Note     Today's date: 2020  Patient name: Frances Jones  : 1955  MRN: [id number]  Referring provider: Adonay Xie MD  Dx:   Encounter Diagnosis     ICD-10-CM    1  Status post left knee replacement [id number] [id number]    2  Acute pain of left knee [id number]                   Subjective: Reports riding 33 miles yesterday and it felt great  Objective: See treatment diary below    Assessment: Tolerated treatment well  Patient would benefit from continued PT   122 degrees for PROM flexion post tx  Plan: Continue per plan of care        Precautions: none     Manuals       Prone knee extension MET, MWM, STM 15 ND ND ND ND ND ND                    PF joint mobs  ND ND ND ND ND ND                   Neuro Re-Ed             Quad set 20x10" ND ND                       exaggerated gait 4x20dt ND ND ND ND ND ND                                Tband TKE Blue 20x5 ND ND ND ND ND ND      Step ups   20x ND ND ND ND      Ther Ex             LLLD stretch for extension        5 min      Prone RF      3 min          AROM bike 10 min ND ND ND ND ND ND      VG DLS  59% 57C ND ND ND ND ND ND      Quadriped knee flexion 20x10" ND ND  ND ND ND      LAQ 30x MRE20x ND  ND ND ND      Leg press  45lbs 2x10 ND ND ND ND Nd ND      Knee flexion off edge of chair    20x         Ther Activity                                       Gait Training             Push off                          Modalities             h-wave

## 2020-07-03 ENCOUNTER — APPOINTMENT (OUTPATIENT)
Dept: PHYSICAL THERAPY | Facility: CLINIC | Age: 65
End: 2020-07-03
Payer: MEDICARE

## 2020-07-03 ENCOUNTER — OFFICE VISIT (OUTPATIENT)
Dept: PHYSICAL THERAPY | Facility: CLINIC | Age: 65
End: 2020-07-03
Payer: MEDICARE

## 2020-07-03 DIAGNOSIS — M25.562 ACUTE PAIN OF LEFT KNEE: ICD-10-CM

## 2020-07-03 DIAGNOSIS — Z96.652 STATUS POST LEFT KNEE REPLACEMENT: Primary | ICD-10-CM

## 2020-07-03 PROCEDURE — 97140 MANUAL THERAPY 1/> REGIONS: CPT | Performed by: PHYSICAL THERAPIST

## 2020-07-03 PROCEDURE — 97112 NEUROMUSCULAR REEDUCATION: CPT | Performed by: PHYSICAL THERAPIST

## 2020-07-03 PROCEDURE — 97110 THERAPEUTIC EXERCISES: CPT | Performed by: PHYSICAL THERAPIST

## 2020-07-03 NOTE — PROGRESS NOTES
Daily Note     Today's date: 7/3/2020  Patient name: Ludwin Parks  : 1955  MRN: [id number]  Referring provider: Nate Thayer MD  Dx:   Encounter Diagnosis     ICD-10-CM    1  Status post left knee replacement Z96 652    2  Acute pain of left knee M25 562                   Subjective: Reports continued stiffness with bending and straightening  Objective: See treatment diary below    Assessment: Tolerated treatment well  Patient would benefit from continued PT  124 degrees for PROM flexion post tx  Plan: Continue per plan of care        Precautions: none     Manuals 6/17 6/19 6/22 6/24 6/26 6/29 7/1 7/3     Prone knee extension MET, MWM, STM 15 ND ND ND ND ND ND ND                   PF joint mobs  ND ND ND ND ND ND ND                  Neuro Re-Ed             Quad set 20x10" ND ND                       exaggerated gait 4x20dt ND ND ND ND ND ND ND                               Tband TKE Blue 20x5 ND ND ND ND ND ND ND     Step ups   20x ND ND ND ND ND     Ther Ex             LLLD stretch for extension        5 min      Prone RF      3 min     ND     AROM bike 10 min ND ND ND ND ND ND ND     VG DLS  89% 17T ND ND ND ND ND ND 50% ND     Quadriped knee flexion 20x10" ND ND  ND ND ND ND     LAQ 30x MRE20x ND  ND ND ND 5lbs ND     Leg press  45lbs 2x10 ND ND ND ND Nd ND ND     Knee flexion off edge of chair    20x         Ther Activity                                       Gait Training             Push off                          Modalities             h-wave

## 2020-07-06 ENCOUNTER — OFFICE VISIT (OUTPATIENT)
Dept: PHYSICAL THERAPY | Facility: CLINIC | Age: 65
End: 2020-07-06
Payer: MEDICARE

## 2020-07-06 DIAGNOSIS — Z96.652 STATUS POST LEFT KNEE REPLACEMENT: Primary | ICD-10-CM

## 2020-07-06 DIAGNOSIS — M25.562 ACUTE PAIN OF LEFT KNEE: ICD-10-CM

## 2020-07-06 PROCEDURE — 97140 MANUAL THERAPY 1/> REGIONS: CPT | Performed by: PHYSICAL THERAPIST

## 2020-07-06 PROCEDURE — 97110 THERAPEUTIC EXERCISES: CPT | Performed by: PHYSICAL THERAPIST

## 2020-07-06 PROCEDURE — 97112 NEUROMUSCULAR REEDUCATION: CPT | Performed by: PHYSICAL THERAPIST

## 2020-07-06 NOTE — PROGRESS NOTES
Daily Note     Today's date: 2020  Patient name: Marco Alvarez  : 1955  MRN: [id number]  Referring provider: Kota Groves MD  Dx:   Encounter Diagnosis     ICD-10-CM    1  Status post left knee replacement Z96 652    2  Acute pain of left knee M25 562                   Subjective: Reports that the knee felt good over the weekend  Objective: See treatment diary below    Assessment: Tolerated treatment well  Patient would benefit from continued PT  124 degrees for PROM flexion post tx  Plan: Continue per plan of care        Precautions: none     Manuals 6/17 6/19 6/22 6/24 6/26 6/29 7/1 7/3 7/6    Prone knee extension MET, MWM, STM 15 ND ND ND ND ND ND ND ND                  PF joint mobs  ND ND ND ND ND ND ND ND                 Neuro Re-Ed             Quad set 20x10" ND ND                       exaggerated gait 4x20dt ND ND ND ND ND ND ND ND    Eccentric closed chain knee flexion          2x10    MRE hamsting         10x    Tband TKE Blue 20x5 ND ND ND ND ND ND ND ND    Step ups   20x ND ND ND ND ND ND    Ther Ex             LLLD stretch for extension        5 min      Prone RF      3 min     ND ND    AROM bike 10 min ND ND ND ND ND ND ND ND    VG DLS  71% 49V ND ND ND ND ND ND 50% ND ND    Quadriped knee flexion 20x10" ND ND  ND ND ND ND ND    LAQ 30x MRE20x ND  ND ND ND 5lbs ND ND    Leg press  45lbs 2x10 ND ND ND ND Nd ND ND 60lbs  3x10    Knee flexion off edge of chair    20x         Ther Activity                                       Gait Training             Push off                          Modalities             h-wave

## 2020-07-08 ENCOUNTER — OFFICE VISIT (OUTPATIENT)
Dept: PHYSICAL THERAPY | Facility: CLINIC | Age: 65
End: 2020-07-08
Payer: MEDICARE

## 2020-07-08 DIAGNOSIS — M25.562 ACUTE PAIN OF LEFT KNEE: ICD-10-CM

## 2020-07-08 DIAGNOSIS — Z96.652 STATUS POST LEFT KNEE REPLACEMENT: Primary | ICD-10-CM

## 2020-07-08 PROCEDURE — 97110 THERAPEUTIC EXERCISES: CPT | Performed by: PHYSICAL THERAPIST

## 2020-07-08 PROCEDURE — 97112 NEUROMUSCULAR REEDUCATION: CPT | Performed by: PHYSICAL THERAPIST

## 2020-07-08 PROCEDURE — 97140 MANUAL THERAPY 1/> REGIONS: CPT | Performed by: PHYSICAL THERAPIST

## 2020-07-08 NOTE — PROGRESS NOTES
Daily Note     Today's date: 2020  Patient name: Remington Lloyd  : 1955  MRN: [id number]  Referring provider: Yazan Mackay MD  Dx:   Encounter Diagnosis     ICD-10-CM    1  Status post left knee replacement Z96 652    2  Acute pain of left knee M25 562                   Subjective: Reports continued difficulty with bending but graston improved mobilty    Objective: See treatment diary below    Assessment: Tolerated treatment well  Patient would benefit from continued PT   120 degrees for PROM flexion post tx  Plan: Continue per plan of care        Precautions: none     Manuals             Prone knee extension MET, MWM, STM 15                          PF joint mobs    Graston to quad ND                         Neuro Re-Ed             Functional RF stretch 10x10"            lunges 10x            exaggerated gait NP            Eccentric closed chain knee flexion  NP            MRE hamsting 10x            Tband TKE Blue 20x5            Step ups 20x            Ther Ex                                       AROM bike 10 min            VG DLS  65% 20W            Quadriped knee flexion 20x10"            LAQ 30x 5lbs             Leg press  60 lbs 2x10                         Ther Activity                                       Gait Training                                       Modalities

## 2020-07-10 ENCOUNTER — OFFICE VISIT (OUTPATIENT)
Dept: PHYSICAL THERAPY | Facility: CLINIC | Age: 65
End: 2020-07-10
Payer: MEDICARE

## 2020-07-10 DIAGNOSIS — M25.562 ACUTE PAIN OF LEFT KNEE: ICD-10-CM

## 2020-07-10 DIAGNOSIS — Z96.652 STATUS POST LEFT KNEE REPLACEMENT: Primary | ICD-10-CM

## 2020-07-10 PROCEDURE — 97140 MANUAL THERAPY 1/> REGIONS: CPT | Performed by: PHYSICAL THERAPIST

## 2020-07-10 PROCEDURE — 97110 THERAPEUTIC EXERCISES: CPT | Performed by: PHYSICAL THERAPIST

## 2020-07-10 PROCEDURE — 97112 NEUROMUSCULAR REEDUCATION: CPT | Performed by: PHYSICAL THERAPIST

## 2020-07-10 NOTE — PROGRESS NOTES
Daily Note     Today's date: 7/10/2020  Patient name: Humble Jenkins  : 1955  MRN: [id number]  Referring provider: Palma Sosa MD  Dx:   Encounter Diagnosis     ICD-10-CM    1  Status post left knee replacement Z96 652    2  Acute pain of left knee M25 562                   Subjective: Reports continued difficulty with descending stairs    Objective: See treatment diary below    Assessment: Tolerated treatment well  Patient would benefit from continued PT   122 degrees for PROM flexion post tx  Plan: Continue per plan of care        Precautions: none     Manuals 7/8 7/10           Prone knee extension MET, MWM, STM 15 ND                         PF joint mobs    Graston to quad ND ND                        Neuro Re-Ed             Functional RF stretch 10x10" ND           lunges 10x ND           exaggerated gait NP            Eccentric closed chain knee flexion  NP            MRE hamsting 10x ND           Tband TKE Blue 20x5 ND           Step ups 20x            Ther Ex                                       AROM bike 10 min ND           VG DLS  71% 85X ND           Quadriped knee flexion 20x10" ND           LAQ 30x 5lbs  ND           Leg press  60 lbs 2x10 ND                        Ther Activity                                       Gait Training                                       Modalities

## 2020-07-13 ENCOUNTER — OFFICE VISIT (OUTPATIENT)
Dept: PHYSICAL THERAPY | Facility: CLINIC | Age: 65
End: 2020-07-13
Payer: MEDICARE

## 2020-07-13 DIAGNOSIS — Z96.652 STATUS POST LEFT KNEE REPLACEMENT: Primary | ICD-10-CM

## 2020-07-13 PROCEDURE — 97140 MANUAL THERAPY 1/> REGIONS: CPT | Performed by: PHYSICAL THERAPIST

## 2020-07-13 PROCEDURE — 97112 NEUROMUSCULAR REEDUCATION: CPT | Performed by: PHYSICAL THERAPIST

## 2020-07-13 PROCEDURE — 97110 THERAPEUTIC EXERCISES: CPT | Performed by: PHYSICAL THERAPIST

## 2020-07-13 NOTE — PROGRESS NOTES
Daily Note     Today's date: 2020  Patient name: Danielle Beatty  : 1955  MRN: [id number]  Referring provider: Allan Chiu MD  Dx:   Encounter Diagnosis     ICD-10-CM    1  Status post left knee replacement Z96 652                   Subjective: Pt reports going down stairs has continued to be the most difficult recently  No issues with HEP or after lv  Objective: See treatment diary below      Assessment: Tolerated treatment well  Patient would benefit from continued PT  Per pt, ND measured knee flexion at 118* today  Pt continues to progress in activity well  Continue to strengthen as tolerated  Plan: Continue per plan of care        Precautions: none     Manuals 7/8 7/10 7/13          Prone knee extension MET, MWM, STM 15 ND ND                        PF joint mobs    Graston to quad ND ND ND                       Neuro Re-Ed             Functional RF stretch 10x10" ND CB          lunges 10x ND CB          exaggerated gait NP            Eccentric closed chain knee flexion  NP  x10          MRE hamsting 10x ND ND          Tband TKE Blue 20x5 ND CB          Step ups 20x            Ther Ex                                       AROM bike 10 min ND CB          VG DLS  25% 44Z ND CB          Quadriped knee flexion 20x10" ND CB          LAQ 30x 5lbs  ND CB          Leg press  60 lbs 2x10 ND CB                       Ther Activity                                       Gait Training                                       Modalities

## 2020-07-15 ENCOUNTER — OFFICE VISIT (OUTPATIENT)
Dept: PHYSICAL THERAPY | Facility: CLINIC | Age: 65
End: 2020-07-15
Payer: MEDICARE

## 2020-07-15 DIAGNOSIS — Z96.652 STATUS POST LEFT KNEE REPLACEMENT: Primary | ICD-10-CM

## 2020-07-15 DIAGNOSIS — M25.562 ACUTE PAIN OF LEFT KNEE: ICD-10-CM

## 2020-07-15 PROCEDURE — 97110 THERAPEUTIC EXERCISES: CPT | Performed by: PHYSICAL THERAPIST

## 2020-07-15 PROCEDURE — 97112 NEUROMUSCULAR REEDUCATION: CPT | Performed by: PHYSICAL THERAPIST

## 2020-07-15 PROCEDURE — 97140 MANUAL THERAPY 1/> REGIONS: CPT | Performed by: PHYSICAL THERAPIST

## 2020-07-15 NOTE — PROGRESS NOTES
Daily Note     Today's date: 7/15/2020  Patient name: Basil Grant  : 1955  MRN: [id number]  Referring provider: Mary Kay Barrios MD  Dx:   Encounter Diagnosis     ICD-10-CM    1  Status post left knee replacement Z96 652    2  Acute pain of left knee M25 562                   Subjective: Patient continues to report limitation with bending  Objective: See treatment diary below    Assessment: Tolerated treatment well  Patient would benefit from continued PT  Plan: Continue per plan of care        Precautions: none     Manuals 7/8 7/10 7/13 7/15         Prone knee extension MET, MWM, STM 15 ND ND ND                       PF joint mobs    Graston to quad ND ND ND ND                      Neuro Re-Ed             Functional RF stretch 10x10" ND CB ND         lunges 10x ND CB ND         exaggerated gait NP            Eccentric closed chain knee flexion  NP  x10          MRE hamsting 10x ND ND ND         Tband TKE Blue 20x5 ND CB          Step ups 20x            Ther Ex                                       AROM bike 10 min ND CB ND         VG DLS  06% 55W ND CB ND         Quadriped knee flexion 20x10" ND CB ND         LAQ 30x 5lbs  ND CB Knee extension machine 10lbs 3x10         Leg press  60 lbs 2x10 ND CB ND                      Ther Activity                                       Gait Training                                       Modalities

## 2020-07-17 ENCOUNTER — OFFICE VISIT (OUTPATIENT)
Dept: PHYSICAL THERAPY | Facility: CLINIC | Age: 65
End: 2020-07-17
Payer: MEDICARE

## 2020-07-17 DIAGNOSIS — M25.562 ACUTE PAIN OF LEFT KNEE: ICD-10-CM

## 2020-07-17 DIAGNOSIS — Z96.652 STATUS POST LEFT KNEE REPLACEMENT: Primary | ICD-10-CM

## 2020-07-17 PROCEDURE — 97112 NEUROMUSCULAR REEDUCATION: CPT | Performed by: PHYSICAL THERAPIST

## 2020-07-17 PROCEDURE — 97110 THERAPEUTIC EXERCISES: CPT | Performed by: PHYSICAL THERAPIST

## 2020-07-17 PROCEDURE — 97140 MANUAL THERAPY 1/> REGIONS: CPT | Performed by: PHYSICAL THERAPIST

## 2020-07-17 NOTE — PROGRESS NOTES
Daily Note     Today's date: 2020  Patient name: Sharon Pardo  : 1955  MRN: [id number]  Referring provider: Nik Vela MD  Dx:   Encounter Diagnosis     ICD-10-CM    1  Status post left knee replacement Z96 652    2  Acute pain of left knee M25 562                   Subjective: Patient continues to report limitation with bending  Objective: See treatment diary below    Assessment: Tolerated treatment well  Patient would benefit from continued PT  Plan: Continue per plan of care        Precautions: none     Manuals 7/8 7/10 7/13 7/15 7/17        Prone knee extension MET, MWM, STM 15 ND ND ND ND                      PF joint mobs    Graston to quad ND ND ND ND ND                     Neuro Re-Ed             Functional RF stretch 10x10" ND CB ND ND        lunges 10x ND CB ND 20x        exaggerated gait NP            Eccentric closed chain knee flexion  NP  x10          MRE hamsting 10x ND ND ND ND        Tband TKE Blue 20x5 ND CB          Step ups 20x            Ther Ex                                       AROM bike 10 min ND CB ND ND        VG DLS  73% 29Z ND CB ND ND        Quadriped knee flexion 20x10" ND CB ND ND        LAQ 30x 5lbs  ND CB Knee extension machine 10lbs 3x10 ND        Leg press  60 lbs 2x10 ND CB ND ND                     Ther Activity                                       Gait Training                                       Modalities

## 2020-07-20 ENCOUNTER — OFFICE VISIT (OUTPATIENT)
Dept: PHYSICAL THERAPY | Facility: CLINIC | Age: 65
End: 2020-07-20
Payer: MEDICARE

## 2020-07-20 DIAGNOSIS — M25.562 ACUTE PAIN OF LEFT KNEE: ICD-10-CM

## 2020-07-20 DIAGNOSIS — Z96.652 STATUS POST LEFT KNEE REPLACEMENT: Primary | ICD-10-CM

## 2020-07-20 PROCEDURE — 97110 THERAPEUTIC EXERCISES: CPT | Performed by: PHYSICAL THERAPIST

## 2020-07-20 PROCEDURE — 97140 MANUAL THERAPY 1/> REGIONS: CPT | Performed by: PHYSICAL THERAPIST

## 2020-07-20 PROCEDURE — 97112 NEUROMUSCULAR REEDUCATION: CPT | Performed by: PHYSICAL THERAPIST

## 2020-07-20 NOTE — PROGRESS NOTES
Daily Note     Today's date: 2020  Patient name: Samra Cabezas  : 1955  MRN: [id number]  Referring provider: Mo Leal MD  Dx:   Encounter Diagnosis     ICD-10-CM    1  Status post left knee replacement Z96 652    2  Acute pain of left knee M25 562                   Subjective: Patient continues to report limitation with bending  Objective: See treatment diary below 0 -120 degrees, cretitus noted with flexion  MMT hip ext and abd 4/    Assessment: Tolerated treatment well  Patient would benefit from continued PT  Plan: Continue per plan of care        Precautions: none     Manuals 7/8 7/10 7/13 7/15 7/17 7/20       Prone knee extension MET, MWM, STM 15 ND ND ND ND ND                     PF joint mobs    Graston to quad ND ND ND ND ND ND                    Neuro Re-Ed             Functional RF stretch 10x10" ND CB ND ND ND       lunges 10x ND CB ND 20x ND       exaggerated gait NP            Eccentric closed chain knee flexion  NP  x10          MRE hamsting 10x ND ND ND ND ND       Tband TKE Blue 20x5 ND CB   ND       Step ups 20x     eccnetric 10x       Ther Ex                                       AROM bike 10 min ND CB ND ND ND       VG DLS  39% 46G ND CB ND ND ND       Quadriped knee flexion 20x10" ND CB ND ND ND       LAQ 30x 5lbs  ND CB Knee extension machine 10lbs 3x10 ND Nd       Leg press  60 lbs 2x10 ND CB ND ND ND                    Ther Activity                                       Gait Training                                       Modalities

## 2020-07-22 ENCOUNTER — OFFICE VISIT (OUTPATIENT)
Dept: PHYSICAL THERAPY | Facility: CLINIC | Age: 65
End: 2020-07-22
Payer: MEDICARE

## 2020-07-22 DIAGNOSIS — Z96.652 STATUS POST LEFT KNEE REPLACEMENT: Primary | ICD-10-CM

## 2020-07-22 DIAGNOSIS — M25.562 ACUTE PAIN OF LEFT KNEE: ICD-10-CM

## 2020-07-22 PROCEDURE — 97140 MANUAL THERAPY 1/> REGIONS: CPT | Performed by: PHYSICAL THERAPIST

## 2020-07-22 PROCEDURE — 97110 THERAPEUTIC EXERCISES: CPT | Performed by: PHYSICAL THERAPIST

## 2020-07-22 PROCEDURE — 97112 NEUROMUSCULAR REEDUCATION: CPT | Performed by: PHYSICAL THERAPIST

## 2020-07-22 NOTE — PROGRESS NOTES
Daily Note     Today's date: 2020  Patient name: You Trimble  : 1955  MRN: [id number]  Referring provider: Triny Foreman MD  Dx:   Encounter Diagnosis     ICD-10-CM    1  Status post left knee replacement Z96 652    2  Acute pain of left knee M25 562                   Subjective: Patient reports MD was happy with his progress    Objective: See treatment diary below 0 -125 degrees, cretitus noted with flexion  MMT hip ext and abd 4-/    Assessment: Tolerated treatment well  Patient would benefit from continued PT  Plan: Continue per plan of care        Precautions: none     Manuals 7/8 7/10 7/13 7/15 7/17 7/20 7/22      Prone knee extension MET, MWM, STM 15 ND ND ND ND ND ND                    PF joint mobs    Graston to quad ND ND ND ND ND ND ND  Held GT                   Neuro Re-Ed             Functional RF stretch 10x10" ND CB ND ND ND       lunges 10x ND CB ND 20x ND       exaggerated gait NP            Eccentric closed chain knee flexion  NP  x10          MRE hamsting 10x ND ND ND ND ND ND      Tband TKE Blue 20x5 ND CB   ND ND      Step ups 20x     eccnetric 10x ND      Ther Ex                          Functional heel raises       10x5"      AROM bike 10 min ND CB ND ND ND ND      VG DLS  08% 02E ND CB ND ND ND ND      Quadriped knee flexion 20x10" ND CB ND ND ND ND      LAQ 30x 5lbs  ND CB Knee extension machine 10lbs 3x10 ND Nd ND      Leg press  60 lbs 2x10 ND CB ND ND ND ND                   Ther Activity                                       Gait Training                                       Modalities

## 2020-07-24 ENCOUNTER — OFFICE VISIT (OUTPATIENT)
Dept: PHYSICAL THERAPY | Facility: CLINIC | Age: 65
End: 2020-07-24
Payer: MEDICARE

## 2020-07-24 DIAGNOSIS — Z96.652 STATUS POST LEFT KNEE REPLACEMENT: Primary | ICD-10-CM

## 2020-07-24 DIAGNOSIS — M25.562 ACUTE PAIN OF LEFT KNEE: ICD-10-CM

## 2020-07-24 PROCEDURE — 97112 NEUROMUSCULAR REEDUCATION: CPT | Performed by: PHYSICAL THERAPIST

## 2020-07-24 PROCEDURE — 97140 MANUAL THERAPY 1/> REGIONS: CPT | Performed by: PHYSICAL THERAPIST

## 2020-07-24 PROCEDURE — 97110 THERAPEUTIC EXERCISES: CPT | Performed by: PHYSICAL THERAPIST

## 2020-07-27 ENCOUNTER — OFFICE VISIT (OUTPATIENT)
Dept: PHYSICAL THERAPY | Facility: CLINIC | Age: 65
End: 2020-07-27
Payer: MEDICARE

## 2020-07-27 DIAGNOSIS — M25.562 ACUTE PAIN OF LEFT KNEE: ICD-10-CM

## 2020-07-27 DIAGNOSIS — Z96.652 STATUS POST LEFT KNEE REPLACEMENT: Primary | ICD-10-CM

## 2020-07-27 PROCEDURE — 97140 MANUAL THERAPY 1/> REGIONS: CPT | Performed by: PHYSICAL THERAPIST

## 2020-07-27 PROCEDURE — 97110 THERAPEUTIC EXERCISES: CPT | Performed by: PHYSICAL THERAPIST

## 2020-07-27 PROCEDURE — 97112 NEUROMUSCULAR REEDUCATION: CPT | Performed by: PHYSICAL THERAPIST

## 2020-07-27 NOTE — PROGRESS NOTES
Daily Note     Today's date: 2020  Patient name: Aurelia Henderson  : 1955  MRN: [id number]  Referring provider: Zaheer Giles MD  Dx:   Encounter Diagnosis     ICD-10-CM    1  Status post left knee replacement Z96 652    2  Acute pain of left knee M25 562                   Subjective: Patient reports increase pain from morning walk  He was able to ride his bike over the weekend and had no issue with hills    Objective: See treatment diary below     Assessment: Tolerated treatment well  Patient would benefit from continued PT  Plan: Continue per plan of care        Precautions: none     Manuals 7/8 7/10 7/13 7/15 7/17 7/20 7/22 7/24 7/27    Prone knee extension MET, MWM, STM 15 ND ND ND ND ND ND ND ND                  PF joint mobs    Graston to quad ND ND ND ND ND ND ND  Held GT ND ND                 Neuro Re-Ed             Functional RF stretch 10x10" ND CB ND ND ND       lunges 10x ND CB ND 20x ND       exaggerated gait NP            Eccentric closed chain knee flexion  NP  x10          MRE hamsting 10x ND ND ND ND ND ND ND ND    Tband TKE Blue 20x5 ND CB   ND ND  ND    Step ups 20x     eccnetric 10x ND ND ND    Ther Ex                          Functional heel raises       10x5" ND ND    AROM bike 10 min ND CB ND ND ND ND ND ND    VG DLS  23% 76F ND CB ND ND ND ND ND ND    Quadriped knee flexion 20x10" ND CB ND ND ND ND ND ND with strap    LAQ 30x 5lbs  ND CB Knee extension machine 10lbs 3x10 ND Nd ND ND ND    Leg press  60 lbs 2x10 ND CB ND ND ND ND ND ND                 Ther Activity                                       Gait Training                                       Modalities
normal...

## 2020-07-29 ENCOUNTER — OFFICE VISIT (OUTPATIENT)
Dept: PHYSICAL THERAPY | Facility: CLINIC | Age: 65
End: 2020-07-29
Payer: MEDICARE

## 2020-07-29 DIAGNOSIS — M25.562 ACUTE PAIN OF LEFT KNEE: ICD-10-CM

## 2020-07-29 DIAGNOSIS — Z96.652 STATUS POST LEFT KNEE REPLACEMENT: Primary | ICD-10-CM

## 2020-07-29 PROCEDURE — 97140 MANUAL THERAPY 1/> REGIONS: CPT | Performed by: PHYSICAL THERAPIST

## 2020-07-29 PROCEDURE — 97112 NEUROMUSCULAR REEDUCATION: CPT | Performed by: PHYSICAL THERAPIST

## 2020-07-29 PROCEDURE — 97110 THERAPEUTIC EXERCISES: CPT | Performed by: PHYSICAL THERAPIST

## 2020-07-29 NOTE — PROGRESS NOTES
Daily Note     Today's date: 2020  Patient name: Nereyda Hidalgo  : 1955  MRN: [id number]  Referring provider: José Miguel Maurer MD  Dx:   Encounter Diagnosis     ICD-10-CM    1  Status post left knee replacement Z96 652    2  Acute pain of left knee M25 562                   Subjective: Patient reports continued intermittent pain with walking  Objective: See treatment diary below     Assessment: Tolerated treatment well  Patient would benefit from continued PT  Plan: Continue per plan of care        Precautions: none     Manuals             Prone knee extension MET, MWM, STM 15                          PF joint mobs    Graston to quad ND                         Neuro Re-Ed                                                                 MRE hamsting 10x            Tband TKE Blue 20x5            Eccentric step downs 20x            Ther Ex                          Functional heel raises 10x5"            AROM bike 10 min            VG DLS  39% 53L            Quadriped knee flexion 20x10"  With strap            Knee extension  10lbs 30x            Leg press  60 lbs 3x10                         Ther Activity                                       Gait Training                                       Modalities

## 2020-07-31 ENCOUNTER — OFFICE VISIT (OUTPATIENT)
Dept: PHYSICAL THERAPY | Facility: CLINIC | Age: 65
End: 2020-07-31
Payer: MEDICARE

## 2020-07-31 DIAGNOSIS — Z96.652 STATUS POST LEFT KNEE REPLACEMENT: Primary | ICD-10-CM

## 2020-07-31 DIAGNOSIS — M25.562 ACUTE PAIN OF LEFT KNEE: ICD-10-CM

## 2020-07-31 PROCEDURE — 97140 MANUAL THERAPY 1/> REGIONS: CPT | Performed by: PHYSICAL THERAPIST

## 2020-07-31 PROCEDURE — 97110 THERAPEUTIC EXERCISES: CPT | Performed by: PHYSICAL THERAPIST

## 2020-07-31 PROCEDURE — 97112 NEUROMUSCULAR REEDUCATION: CPT | Performed by: PHYSICAL THERAPIST

## 2020-07-31 NOTE — PROGRESS NOTES
Daily Note     Today's date: 2020  Patient name: Farhan Dong  : 1955  MRN: [id number]  Referring provider: Tiffani Saldana MD  Dx:   Encounter Diagnosis     ICD-10-CM    1  Status post left knee replacement Z96 652    2  Acute pain of left knee M25 562                   Subjective: Patient reports a 50% improvement since his surgery  He still has concerns with being able to bend, navigate stairs and walk without pain  0/10 pain at rest and 3/10 at worst      Objective: See treatment diary below PROM 0-122 MMT 4/5 extension and flexion    Assessment: Tolerated treatment well  Plan: Will continue with HEP and will contact PT 1 month prior to next MD appointment if not progressing     Precautions: none     Manuals             Prone knee extension MET, MWM, STM 15                          PF joint mobs    Graston to quad ND                         Neuro Re-Ed                                                                              Tband TKE Blue 20x5            Eccentric step downs 20x            Ther Ex                          Functional heel raises 10x5"            AROM bike 10 min            VG DLS  74% 87F            Quadriped knee flexion 20x10"  With strap            Knee extension  10lbs 30x            Leg press  60 lbs 3x10            Standing lunges    tband hip 4 way 20x      Red 10x10"            Ther Activity                                       Gait Training                                       Modalities

## 2021-03-10 DIAGNOSIS — Z23 ENCOUNTER FOR IMMUNIZATION: ICD-10-CM

## 2021-07-28 PROBLEM — Z12.11 COLON CANCER SCREENING: Status: ACTIVE | Noted: 2021-07-28

## 2021-10-28 ENCOUNTER — EVALUATION (OUTPATIENT)
Dept: PHYSICAL THERAPY | Facility: CLINIC | Age: 66
End: 2021-10-28
Payer: MEDICARE

## 2021-10-28 DIAGNOSIS — M25.512 ACUTE PAIN OF LEFT SHOULDER: Primary | ICD-10-CM

## 2021-10-28 PROCEDURE — 97162 PT EVAL MOD COMPLEX 30 MIN: CPT | Performed by: PHYSICAL THERAPIST

## 2021-10-28 PROCEDURE — 97112 NEUROMUSCULAR REEDUCATION: CPT | Performed by: PHYSICAL THERAPIST

## 2021-11-03 ENCOUNTER — OFFICE VISIT (OUTPATIENT)
Dept: PHYSICAL THERAPY | Facility: CLINIC | Age: 66
End: 2021-11-03
Payer: MEDICARE

## 2021-11-03 DIAGNOSIS — M25.512 ACUTE PAIN OF LEFT SHOULDER: Primary | ICD-10-CM

## 2021-11-03 PROCEDURE — 97140 MANUAL THERAPY 1/> REGIONS: CPT | Performed by: PHYSICAL THERAPIST

## 2021-11-03 PROCEDURE — 97112 NEUROMUSCULAR REEDUCATION: CPT | Performed by: PHYSICAL THERAPIST

## 2021-11-11 ENCOUNTER — APPOINTMENT (OUTPATIENT)
Dept: PHYSICAL THERAPY | Facility: CLINIC | Age: 66
End: 2021-11-11
Payer: MEDICARE

## 2022-07-20 NOTE — PROGRESS NOTES
Daily Note     Today's date: 2020  Patient name: Frances Jones  : 1955  MRN: [id number]  Referring provider: Adonay Xie MD  Dx:   Encounter Diagnosis     ICD-10-CM    1  Status post left knee replacement Z96 652    2  Acute pain of left knee M25 562                   Subjective: Patient reports increase stiffness today  Objective: See treatment diary below 0 -120 degrees, cretitus noted with flexion  MMT hip ext and abd 4/    Assessment: Tolerated treatment well  Patient would benefit from continued PT  Plan: Continue per plan of care        Precautions: none     Manuals 7/8 7/10 7/13 7/15 7/17 7/20 7/22 7/24     Prone knee extension MET, MWM, STM 15 ND ND ND ND ND ND ND                   PF joint mobs    Graston to quad ND ND ND ND ND ND ND  Held GT ND                  Neuro Re-Ed             Functional RF stretch 10x10" ND CB ND ND ND       lunges 10x ND CB ND 20x ND       exaggerated gait NP            Eccentric closed chain knee flexion  NP  x10          MRE hamsting 10x ND ND ND ND ND ND ND     Tband TKE Blue 20x5 ND CB   ND ND      Step ups 20x     eccnetric 10x ND ND     Ther Ex                          Functional heel raises       10x5" ND     AROM bike 10 min ND CB ND ND ND ND ND     VG DLS  83% 91D ND CB ND ND ND ND ND     Quadriped knee flexion 20x10" ND CB ND ND ND ND ND     LAQ 30x 5lbs  ND CB Knee extension machine 10lbs 3x10 ND Nd ND ND     Leg press  60 lbs 2x10 ND CB ND ND ND ND ND                  Ther Activity                                       Gait Training                                       Modalities
Primary Diagnosis:  MDD (major depressive disorder) [F32.9]        Problem Dx:   
Primary Diagnosis:  MDD (major depressive disorder) [F32.9]        Problem Dx:

## 2022-10-12 PROBLEM — Z12.11 COLON CANCER SCREENING: Status: RESOLVED | Noted: 2021-07-28 | Resolved: 2022-10-12

## 2023-02-15 ENCOUNTER — EVALUATION (OUTPATIENT)
Dept: PHYSICAL THERAPY | Facility: CLINIC | Age: 68
End: 2023-02-15

## 2023-02-15 DIAGNOSIS — M25.512 CHRONIC LEFT SHOULDER PAIN: ICD-10-CM

## 2023-02-15 DIAGNOSIS — G89.29 CHRONIC LEFT SHOULDER PAIN: ICD-10-CM

## 2023-02-15 DIAGNOSIS — Z98.890 S/P LEFT ROTATOR CUFF REPAIR: Primary | ICD-10-CM

## 2023-02-15 NOTE — PROGRESS NOTES
PT Evaluation     Today's date: 2/15/2023  Patient name: Nain Gibbs  : 1955  MRN: [id number]  Referring provider: No ref  provider found  Dx:   Encounter Diagnosis     ICD-10-CM    1  S/P left rotator cuff repair  Z98 890       2  Chronic left shoulder pain  M25 512     G89 29                      Assessment  Assessment details: Patient is a 75 y o  male who presents to physical therapy with physician diagnosis of S/P left rotator cuff repair  (primary encounter diagnosis)  Chronic left shoulder pain  Patient would benefit from skilled physical therapy intervention to address his impairments and to maximize function  Thank you for your referral and please feel free to contact me at [phone]  Understanding of Dx/Px/POC: good   Prognosis: good    Goals  STG 2 weeks  Improve ROM per MD protocol  Decrease pain by 50%    LTG  Transition to PT in [place] and provide [address] details: Patient will be transferring to a PT location in [place] in 2 weeks  Patient would benefit from: skilled physical therapy  Frequency: 2x week  Duration in weeks: 2  Treatment plan discussed with: patient        Subjective Evaluation    History of Present Illness  Mechanism of injury: Per physician on 2023:    HPI: Patient is here for a post-op visit  He is s/p Left shoulder arthroscopic rotator cuff repair, subacromial decompression with acromioplasty, arthroscopic distal clavicle excision, extensive degenerative labral debridement with biceps tenotomy on 23  He is wearing his sling today  He had some soreness in his neck  He is now having mild discomfort over the [facility] [place], operated by [facility] joint  Overall he states the pain has been well controlled  He is scheduled to see PT  He is going to [place] for 1 month, starting next week  Per PT:  Currently has no pain at rest  He does have some neck discomfort  He has not performed an TE    He is wearing the sling with activity but taking it off at rest  MD recommended continued sling x3 additional weeks  Denies taking meds but has been icing PRN              Not a recurrent problem   Quality of life: good    Pain  Current pain ratin  At best pain ratin  At worst pain ratin  Quality: discomfort  Relieving factors: ice and rest      Diagnostic Tests  No diagnostic tests performed  Patient Goals  Patient goals for therapy: decreased pain, increased motion, increased strength, independence with ADLs/IADLs and return to sport/leisure activities          Objective     General Comments:      Shoulder Comments   PROM flexion 110, abd 90, ER 60 IR 10  Incision closed and healing  ttp subscpularis              Precautions: MD protocol      Manuals 2/15                         STM to axilla and PROM per MD protocol ND                                      Neuro Re-Ed                                                                                                        Ther Ex                          Table slides flexion 10x10"            Supine cervical elongation 10x10"            LTR with arm at side 10x10"            tband ext and add  NV                                                   Ther Activity                                       Gait Training                                       Modalities

## 2023-02-15 NOTE — LETTER
2023    [address] [facility] [place] [address]    Patient: Marko Santoro   YOB: 1955   Date of Visit: 2/15/2023     Encounter Diagnosis     ICD-10-CM    1  S/P left rotator cuff repair  Z98 890       2  Chronic left shoulder pain  M25 512     G89 29           Dear Dr Myles Thompson: Thank you for your recent referral of Marko Santoro  Please review the attached evaluation summary from [place]'s recent visit  Please verify that you agree with the plan of care by signing the attached order  If you have any questions or concerns, please do not hesitate to call  I sincerely appreciate the opportunity to share in the care of one of your patients and hope to have another opportunity to work with you in the near future  Sincerely,    Alessandra Bennett, PT      Referring Provider:      I certify that I have read the below Plan of Care and certify the need for these services furnished under this plan of treatment while under my care  [address] [facility] [address]  Via Fax: [phone]          PT Evaluation     Today's date: 2/15/2023  Patient name: Marko Santoro  : 1955  MRN: [id number]  Referring provider: No ref  provider found  Dx:   Encounter Diagnosis     ICD-10-CM    1  S/P left rotator cuff repair  Z98 890       2  Chronic left shoulder pain  M25 512     G89 29                      Assessment  Assessment details: Patient is a 75 y o  male who presents to physical therapy with physician diagnosis of S/P left rotator cuff repair  (primary encounter diagnosis)  Chronic left shoulder pain  Patient would benefit from skilled physical therapy intervention to address his impairments and to maximize function  Thank you for your referral and please feel free to contact me at [phone]    Understanding of Dx/Px/POC: good   Prognosis: good    Goals  STG 2 weeks  Improve ROM per MD protocol  Decrease pain by 50%    LTG  Transition to PT in [place] and provide [address] details: Patient will be transferring to a PT location in [place] in 2 weeks  Patient would benefit from: skilled physical therapy  Frequency: 2x week  Duration in weeks: 2  Treatment plan discussed with: patient        Subjective Evaluation    History of Present Illness  Mechanism of injury: Per physician on 2023:    HPI: Patient is here for a post-op visit  He is s/p Left shoulder arthroscopic rotator cuff repair, subacromial decompression with acromioplasty, arthroscopic distal clavicle excision, extensive degenerative labral debridement with biceps tenotomy on 23  He is wearing his sling today  He had some soreness in his neck  He is now having mild discomfort over the [facility] joint  Overall he states the pain has been well controlled  He is scheduled to see PT  He is going to [place] for 1 month, starting next week  Per PT:  Currently has no pain at rest  He does have some neck discomfort  He has not performed an TE  He is wearing the sling with activity but taking it off at rest  MD recommended continued sling x3 additional weeks  Denies taking meds but has been icing PRN              Not a recurrent problem   Quality of life: good    Pain  Current pain ratin  At best pain ratin  At worst pain ratin  Quality: discomfort  Relieving factors: ice and rest      Diagnostic Tests  No diagnostic tests performed  Patient Goals  Patient goals for therapy: decreased pain, increased motion, increased strength, independence with ADLs/IADLs and return to sport/leisure activities          Objective     General Comments:      Shoulder Comments   PROM flexion 110, abd 90, ER 60 IR 10  Incision closed and healing  ttp subscpularis             Precautions: MD protocol      Manuals 2/15                         STM to axilla and PROM per MD protocol ND                                      Neuro Re-Ed Ther Ex                          Table slides flexion 10x10"            Supine cervical elongation 10x10"            LTR with arm at side 10x10"            tband ext and add  NV                                                   Ther Activity                                       Gait Training                                       Modalities

## 2023-02-17 ENCOUNTER — OFFICE VISIT (OUTPATIENT)
Dept: PHYSICAL THERAPY | Facility: CLINIC | Age: 68
End: 2023-02-17

## 2023-02-17 DIAGNOSIS — Z98.890 S/P LEFT ROTATOR CUFF REPAIR: Primary | ICD-10-CM

## 2023-02-17 DIAGNOSIS — G89.29 CHRONIC LEFT SHOULDER PAIN: ICD-10-CM

## 2023-02-17 DIAGNOSIS — M25.512 CHRONIC LEFT SHOULDER PAIN: ICD-10-CM

## 2023-02-17 NOTE — PROGRESS NOTES
Daily Note     Today's date: 2023  Patient name: Jacinto Art  : 1955  MRN: [id number]  Referring provider: No ref  provider found  Dx:   Encounter Diagnosis     ICD-10-CM    1  S/P left rotator cuff repair  Z98 890       2  Chronic left shoulder pain  M25 512     G89 29                      Subjective: Reports compliance with his HEP  Objective: See treatment diary below      Assessment: Tolerated treatment well  Patient would benefit from continued PT      Plan: Continue per plan of care        Precautions: MD protocol      Manuals                          STM to axilla and PROM per MD protocol ND                                      Neuro Re-Ed                                                                                                        Ther Ex                          Table slides flexion 10x10"            Supine cervical elongation 10x10"            LTR with arm at side 10x10"            tband ext and add  Red 10x10" each                                                   Ther Activity                                       Gait Training                                       Modalities

## 2023-02-20 ENCOUNTER — OFFICE VISIT (OUTPATIENT)
Dept: PHYSICAL THERAPY | Facility: CLINIC | Age: 68
End: 2023-02-20

## 2023-02-20 DIAGNOSIS — G89.29 CHRONIC LEFT SHOULDER PAIN: ICD-10-CM

## 2023-02-20 DIAGNOSIS — M25.512 CHRONIC LEFT SHOULDER PAIN: ICD-10-CM

## 2023-02-20 DIAGNOSIS — Z98.890 S/P LEFT ROTATOR CUFF REPAIR: Primary | ICD-10-CM

## 2023-02-20 NOTE — PROGRESS NOTES
Daily Note     Today's date: 2023  Patient name: Cesar Vazquez  : 1955  MRN: [id number]  Referring provider: No ref  provider found  Dx:   Encounter Diagnosis     ICD-10-CM    1  S/P left rotator cuff repair  Z98 890       2  Chronic left shoulder pain  M25 512     G89 29                      Subjective: Reports improved ROM and decrease pain overall  Objective: See treatment diary below      Assessment: Tolerated treatment well  Patient would benefit from continued PT      Plan: Continue per plan of care        Precautions: MD protocol      Manuals                          STM to axilla and PROM per MD protocol ND                                      Neuro Re-Ed                                                                                                        Ther Ex                          Table slides flexion 10x10"            Supine cervical elongation 10x10"            LTR with arm at side 10x10"            tband ext and add  Red 10x10" each            Serratus punch at all  10x10"                                      Ther Activity                                       Gait Training                                       Modalities

## 2023-02-22 ENCOUNTER — OFFICE VISIT (OUTPATIENT)
Dept: PHYSICAL THERAPY | Facility: CLINIC | Age: 68
End: 2023-02-22

## 2023-02-22 DIAGNOSIS — G89.29 CHRONIC LEFT SHOULDER PAIN: ICD-10-CM

## 2023-02-22 DIAGNOSIS — Z98.890 S/P LEFT ROTATOR CUFF REPAIR: Primary | ICD-10-CM

## 2023-02-22 DIAGNOSIS — M25.512 CHRONIC LEFT SHOULDER PAIN: ICD-10-CM

## 2023-02-22 NOTE — PROGRESS NOTES
Daily Note     Today's date: 2023  Patient name: Pierre Suarez  : 1955  MRN: [id number]  Referring provider: No ref  provider found  Dx:   Encounter Diagnosis     ICD-10-CM    1  S/P left rotator cuff repair  Z98 890       2  Chronic left shoulder pain  M25 512     G89 29                      Subjective: Reports improved ROM and decrease pain overall  Objective: See treatment diary below      Assessment: Tolerated treatment well  Patient would benefit from continued PT      Plan: will be on hold due to going on vacation until the end of March  Patient will attend PT while on vacation      Precautions: MD protocol      Manuals                          STM to axilla and PROM per MD protocol ND                                      Neuro Re-Ed                                                                                                        Ther Ex                          Table slides flexion 10x10"            Supine cervical elongation 10x10"            LTR with arm at side 10x10"            tband ext and add  Red 10x10" each            Serratus punch at all  10x10"            Table slides ER 10x10"                         Ther Activity                                       Gait Training                                       Modalities

## (undated) DEVICE — PACK TUR

## (undated) DEVICE — SCD SEQUENTIAL COMPRESSION COMFORT SLEEVE MEDIUM KNEE LENGTH: Brand: KENDALL SCD

## (undated) DEVICE — UROCATCH BAG

## (undated) DEVICE — GLOVE INDICATOR PI UNDERGLOVE SZ 8 BLUE

## (undated) DEVICE — TUBING SUCTION 5MM X 12 FT

## (undated) DEVICE — GLOVE SRG BIOGEL 7